# Patient Record
Sex: FEMALE | Race: WHITE | Employment: FULL TIME | URBAN - METROPOLITAN AREA
[De-identification: names, ages, dates, MRNs, and addresses within clinical notes are randomized per-mention and may not be internally consistent; named-entity substitution may affect disease eponyms.]

---

## 2017-02-17 ENCOUNTER — HOSPITAL ENCOUNTER (OUTPATIENT)
Dept: MAMMOGRAPHY | Age: 52
Discharge: HOME | End: 2017-02-17
Attending: OBSTETRICS & GYNECOLOGY | Admitting: OBSTETRICS & GYNECOLOGY

## 2017-02-17 DIAGNOSIS — Z12.31 ENCOUNTER FOR SCREENING MAMMOGRAM FOR MALIGNANT NEOPLASM OF BREAST: ICD-10-CM

## 2018-06-20 ENCOUNTER — HOSPITAL ENCOUNTER (OUTPATIENT)
Dept: MAMMOGRAPHY | Age: 53
Discharge: HOME | End: 2018-06-20
Attending: OBSTETRICS & GYNECOLOGY | Admitting: OBSTETRICS & GYNECOLOGY

## 2018-06-20 DIAGNOSIS — Z12.31 VISIT FOR SCREENING MAMMOGRAM: ICD-10-CM

## 2019-07-15 ENCOUNTER — HOSPITAL ENCOUNTER (OUTPATIENT)
Dept: WOMENS IMAGING | Age: 54
Discharge: HOME | End: 2019-07-15
Payer: MEDICAID

## 2019-07-15 DIAGNOSIS — Z12.39 BREAST CANCER SCREENING: ICD-10-CM

## 2019-07-15 PROCEDURE — 77067 SCR MAMMO BI INCL CAD: CPT

## 2020-09-01 ENCOUNTER — HOSPITAL ENCOUNTER (OUTPATIENT)
Dept: WOMENS IMAGING | Age: 55
Discharge: HOME | End: 2020-09-01
Payer: MEDICAID

## 2020-09-01 PROCEDURE — 77063 BREAST TOMOSYNTHESIS BI: CPT

## 2020-10-22 ENCOUNTER — HOSPITAL ENCOUNTER (OUTPATIENT)
Dept: WOMENS IMAGING | Age: 55
Discharge: HOME | End: 2020-10-22
Payer: MEDICAID

## 2020-10-22 PROCEDURE — 96040 HC GENETIC COUNSELING: CPT

## 2020-10-22 NOTE — PROGRESS NOTES
[facility]  New Visit Clinic Note      Patient Name: Gavin Brooks             YOB: 1965  MRN: [id number]  Date of Visit: 10/22/2020          Gavin Brooks was self-referred for genetic counseling due to her family history of cancer. Ms. Thyra Homans was seen in the [facility] at [facility] on  10/22/2020. Marissa Elizondo, Licensed Genetic Counselor, spent 30 minutes collecting and reviewing personal and family medical information, providing genetic risk assessment, genetic counseling and psychosocial assessment. Clinical History: Gavin Brooks is a 60 y.o. female with no personal history of cancer or tumors. Cancer Surveillance:   Mammogram: annual  Biopsy: yes, in early 29's, benign  Colonoscopy: yes  Polyps: 1 - sessile serrated polyp  Upper endoscopy: yes, no polyps  CESIA/BSO: no    Psychosocial history: no concerns reported    Family History:   · Sister, dx. 62 with uterine cancer  · Mother, dx. 76 with breast cancer and dx. 76 with melanoma  · Maternal grandmother, dx. 66's with breast cancer and colon cancer  · Paternal half-brother, dx. 48 with tonsil cancer  · Father, dx. 61 lung cancer    Ancestry: /  [place] ancestry: no    Cancer Risk Assessment and Genetic Testing: We had a detailed discussion surrounding the concepts of hereditary, familial and sporadic cancer. Regarding the hereditary forms of cancer, autosomal dominant inheritance was reviewed. We briefly discussed potential changes in screening and management guidelines that could occur, based on genetic testing results. After reviewing the medical and family histories and risk assessment, we discussed genetic testing options. Specifically, we talked about currently available single-gene and multi-gene panel testing options, including benefits and limitations. After reviewing the medical and family histories, we discussed that Ms. Mcknight was at low risk for a hereditary predisposition to cancer. Therefore, she would not meet criteria for genetic testing at this time. Testing options and cost were still reviewed, and Ms. Mcknight was given the opportunity to pursue testing. The benefits of first testing an affected individual was further discussed. Specifically, the best person to begin genetic testing in a family is someone who has already had cancer, preferably the relative with the youngest age of diagnosis, as they are the most likely individual to have a mutation. We reviewed that if genetic testing is performed in an individual who has not had cancer and the test is negative, it is difficult to know if the result is negative because the individual did not inherit a family mutation or if the test is negative because the cancer in the family is actually caused by other factors that cannot be ruled out by genetic testing. If it can be confirmed that the individual did not inherit a family mutation, then the individual would be recommended to follow general population cancer screening guidelines. However, if the cancer in the family is caused by other factors, the individual should continue screening based on the family history. Therefore a negative test in an unaffected individual may provide false reassurance. Based on Ms. Mcknight family history, the most informative family member to begin testing would be her sister or mother. If a pathogenic variant is found in her sister or mother, then single-site testing could be offered to other family members. If they would pursue testing and such testing was negative, the chance for a pathogenic variant in the family would likely be significantly reduced and thus additional testing for other family members would not be indicated. We realize that it is not always possible to test other family members, therefore the option of testing Ms. Mcknihgt at today's visit was still offered and recommendations would be made accordingly. Ms. Linda Melo declined genetic testing at this time, based on not meeting criteria for testing. She understands that although genetic testing will not be performed, there may still be an increased risk for cancer in her family. Based on the family history, we reviewed the following screening recommendations:   Close female family members should initiate cancer screening approximately 10 years prior to the earliest diagnosis in the family or by the age of 36, whichever comes first. Women in this family should initiate breast cancer screening at the age of 36.  Screening for women at an increased risk of breast cancer includes annual mammogram as well as possibly annual breast MRI   Semi-annual clinical breast exams and monthly self-breast exams are also recommended   Colonoscopies every 3 to 5 years (or based on colonoscopy findings) beginning 10 years prior to the earliest diagnosis in the family or 36years of age, whichever comes first    We remain available to Ms. Mcknight and her healthcare providers should they have any questions or concerns. We may be reached in the [address] [place] [address] at [phone].     Sincerely,    Josy Montana MS, [facility]  Licensed Genetic Counselor

## 2021-06-02 ENCOUNTER — CLINICAL DOCUMENTATION (OUTPATIENT)
Dept: OTHER | Age: 56
End: 2021-06-02

## 2021-10-27 ENCOUNTER — HOSPITAL ENCOUNTER (OUTPATIENT)
Dept: WOMENS IMAGING | Age: 56
Discharge: HOME | End: 2021-10-27
Payer: MEDICAID

## 2021-10-27 DIAGNOSIS — Z12.31 BREAST CANCER SCREENING BY MAMMOGRAM: ICD-10-CM

## 2021-10-27 PROCEDURE — 77067 SCR MAMMO BI INCL CAD: CPT

## 2022-12-12 ENCOUNTER — HOSPITAL ENCOUNTER (OUTPATIENT)
Dept: WOMENS IMAGING | Age: 57
Discharge: HOME | End: 2022-12-12
Payer: MEDICAID

## 2022-12-12 VITALS — BODY MASS INDEX: 25.77 KG/M2 | HEIGHT: 69 IN | WEIGHT: 174 LBS

## 2022-12-12 DIAGNOSIS — Z12.31 BREAST CANCER SCREENING BY MAMMOGRAM: ICD-10-CM

## 2022-12-12 PROCEDURE — 77063 BREAST TOMOSYNTHESIS BI: CPT

## 2024-01-30 ENCOUNTER — HOSPITAL ENCOUNTER (OUTPATIENT)
Dept: WOMENS IMAGING | Age: 59
Discharge: HOME | End: 2024-01-30
Payer: MEDICAID

## 2024-01-30 DIAGNOSIS — R92.8 ABNORMAL MAMMOGRAM: ICD-10-CM

## 2024-01-30 PROCEDURE — G0279 TOMOSYNTHESIS, MAMMO: HCPCS

## 2024-04-23 ENCOUNTER — OFFICE VISIT (OUTPATIENT)
Dept: SURGERY | Age: 59
End: 2024-04-23
Payer: MEDICAID

## 2024-04-23 VITALS
WEIGHT: 178.2 LBS | HEART RATE: 67 BPM | RESPIRATION RATE: 18 BRPM | BODY MASS INDEX: 26.39 KG/M2 | HEIGHT: 69 IN | OXYGEN SATURATION: 98 %

## 2024-04-23 DIAGNOSIS — Z12.39 BREAST CANCER SCREENING, HIGH RISK PATIENT: ICD-10-CM

## 2024-04-23 DIAGNOSIS — Z91.89 AT HIGH RISK FOR BREAST CANCER: Primary | ICD-10-CM

## 2024-04-23 DIAGNOSIS — Z80.3 FAMILY HISTORY OF BREAST CANCER: ICD-10-CM

## 2024-04-23 DIAGNOSIS — Z12.31 ENCOUNTER FOR SCREENING MAMMOGRAM FOR BREAST CANCER: ICD-10-CM

## 2024-04-23 DIAGNOSIS — Z12.39 ENCOUNTER FOR SCREENING BREAST EXAMINATION: ICD-10-CM

## 2024-04-23 PROCEDURE — 1036F TOBACCO NON-USER: CPT | Performed by: NURSE PRACTITIONER

## 2024-04-23 PROCEDURE — G8427 DOCREV CUR MEDS BY ELIG CLIN: HCPCS | Performed by: NURSE PRACTITIONER

## 2024-04-23 PROCEDURE — 3017F COLORECTAL CA SCREEN DOC REV: CPT | Performed by: NURSE PRACTITIONER

## 2024-04-23 PROCEDURE — G8419 CALC BMI OUT NRM PARAM NOF/U: HCPCS | Performed by: NURSE PRACTITIONER

## 2024-04-23 PROCEDURE — 99204 OFFICE O/P NEW MOD 45 MIN: CPT | Performed by: NURSE PRACTITIONER

## 2024-04-23 RX ORDER — METHOCARBAMOL 500 MG/1
500 TABLET, FILM COATED ORAL EVERY 8 HOURS PRN
COMMUNITY

## 2024-04-23 RX ORDER — FLUTICASONE PROPIONATE 50 MCG
50 SPRAY, SUSPENSION (ML) NASAL DAILY
COMMUNITY
Start: 2022-03-25

## 2024-04-23 RX ORDER — LORATADINE 10 MG/1
10 TABLET ORAL DAILY
COMMUNITY

## 2024-04-23 NOTE — PATIENT INSTRUCTIONS
Dermatology: Angelina Vilchis NP   [place] [facility]  [address]  [place] [address]  Phone: [phone]  Fax: [phone]    [place] [address]   [address]  [place] [address]  Phone: [phone]  Fax: [phone]

## 2024-04-23 NOTE — PROGRESS NOTES
4/23/2024), Disp: 120 tablet, Rfl: 0    meloxicam (MOBIC) 15 MG tablet, Take 1 tablet by mouth daily. (Patient not taking: Reported on 4/23/2024), Disp: 30 tablet, Rfl: 3    celecoxib (CELEBREX) 200 MG capsule, Take 1 capsule by mouth daily for 30 doses., Disp: 30 capsule, Rfl: 0    estradiol (ESTRACE) 0.5 MG tablet, Take 0.5 mg by mouth daily. (Patient not taking: Reported on 4/23/2024), Disp: , Rfl:       Medications: documentation has been reviewed in the electronic medical record and patient office intake form.    REVIEW OF SYSTEMS:  Constitutional: Negative for fever  HENT: Negative for sore throat  Eyes: Negative for redness   Respiratory: Negative for dyspnea, cough  Cardiovascular: Negative for chest pain  Gastrointestinal: Negative for vomiting, diarrhea   Genitourinary: Negative for hematuria   Musculoskeletal: Negative for arthralgias   Skin: Negative for rash  Neurological: Negative for syncope  Hematological: Negative for adenopathy  Psychiatric/Behavorial: Negative for anxiety    PHYSICAL EXAM:  Pulse 67   Resp 18   Ht 1.753 m (5' 9\")   Wt 80.8 kg (178 lb 3.2 oz)   LMP 11/12/2010   SpO2 98%   BMI 26.32 kg/m²   Constitutional: She appearswell-nourished. No apparent distress.  Breast: The patient was examined in the upright and supine position.  She has a \"D\"cup breast. Breasts are symmetrically ptotic.        Right: No new masses or changes in breast contour.  No skin changes of the breast or nipple areolar complex.  No nipple inversion or discharge. No erythema, thickening (peau d'orange), or dimpling.        Left: No new masses or changes in breast contour.  No skin changes of the breast or nipple areolar complex.  No nipple inversion or discharge. No erythema, thickening (peau d'orange), or dimpling.   There is no axillary lymphadenopathy palpated bilaterally.   Neck: Neck supple. No tracheal deviation present. No obvious mass.  Pulmonary: No accessory muscle use.  Respirations non-labored and

## 2024-07-02 ENCOUNTER — HOSPITAL ENCOUNTER (OUTPATIENT)
Dept: MRI IMAGING | Age: 59
Discharge: HOME | End: 2024-07-02
Payer: MEDICAID

## 2024-07-02 DIAGNOSIS — Z12.39 BREAST CANCER SCREENING, HIGH RISK PATIENT: ICD-10-CM

## 2024-07-02 DIAGNOSIS — Z12.31 ENCOUNTER FOR SCREENING MAMMOGRAM FOR BREAST CANCER: ICD-10-CM

## 2024-07-02 DIAGNOSIS — Z80.3 FAMILY HISTORY OF BREAST CANCER: ICD-10-CM

## 2024-07-02 DIAGNOSIS — Z12.39 ENCOUNTER FOR SCREENING BREAST EXAMINATION: ICD-10-CM

## 2024-07-02 DIAGNOSIS — Z91.89 AT HIGH RISK FOR BREAST CANCER: ICD-10-CM

## 2024-07-02 LAB
CREAT SERPL-MCNC: 0.6 MG/DL (ref 0.6–1.2)
GFR SERPLBLD CREATININE-BSD FMLA CKD-EPI: >90 ML/MIN/{1.73_M2}

## 2024-07-02 PROCEDURE — 36415 COLL VENOUS BLD VENIPUNCTURE: CPT

## 2024-07-02 PROCEDURE — 6360000004 HC RX CONTRAST MEDICATION: Performed by: NURSE PRACTITIONER

## 2024-07-02 PROCEDURE — A9577 INJ MULTIHANCE: HCPCS | Performed by: NURSE PRACTITIONER

## 2024-07-02 PROCEDURE — 82565 ASSAY OF CREATININE: CPT

## 2024-07-02 PROCEDURE — 2580000003 HC RX 258: Performed by: NURSE PRACTITIONER

## 2024-07-02 PROCEDURE — C8908 MRI W/O FOL W/CONT, BREAST,: HCPCS

## 2024-07-02 RX ORDER — SODIUM CHLORIDE 0.9 % (FLUSH) 0.9 %
5-40 SYRINGE (ML) INJECTION 2 TIMES DAILY
Status: DISCONTINUED | OUTPATIENT
Start: 2024-07-02 | End: 2024-07-03 | Stop reason: HOSPADM

## 2024-07-02 RX ADMIN — GADOBENATE DIMEGLUMINE 16 ML: 529 INJECTION, SOLUTION INTRAVENOUS at 10:58

## 2024-07-02 RX ADMIN — Medication 25 ML: at 10:59

## 2024-07-03 ENCOUNTER — TELEPHONE (OUTPATIENT)
Dept: WOMENS IMAGING | Age: 59
End: 2024-07-03

## 2024-07-03 ENCOUNTER — PRE-PROCEDURE TELEPHONE (OUTPATIENT)
Dept: WOMENS IMAGING | Age: 59
End: 2024-07-03

## 2024-07-03 DIAGNOSIS — R92.8 ABNORMAL FINDING ON BREAST IMAGING: Primary | ICD-10-CM

## 2024-07-24 ENCOUNTER — HOSPITAL ENCOUNTER (OUTPATIENT)
Dept: ULTRASOUND IMAGING | Age: 59
Discharge: HOME | End: 2024-07-24
Payer: MEDICAID

## 2024-07-24 ENCOUNTER — HOSPITAL ENCOUNTER (OUTPATIENT)
Dept: WOMENS IMAGING | Age: 59
Discharge: HOME | End: 2024-07-24
Payer: MEDICAID

## 2024-07-24 VITALS — WEIGHT: 171 LBS | BODY MASS INDEX: 25.33 KG/M2 | HEIGHT: 69 IN

## 2024-07-24 DIAGNOSIS — R92.8 ABNORMAL FINDING ON BREAST IMAGING: ICD-10-CM

## 2024-07-24 PROCEDURE — 77065 DX MAMMO INCL CAD UNI: CPT

## 2024-07-24 PROCEDURE — 2500000003 HC RX 250 WO HCPCS: Performed by: NURSE PRACTITIONER

## 2024-07-24 PROCEDURE — 76642 ULTRASOUND BREAST LIMITED: CPT

## 2024-07-24 PROCEDURE — 88305 TISSUE EXAM BY PATHOLOGIST: CPT

## 2024-07-24 PROCEDURE — 19083 BX BREAST 1ST LESION US IMAG: CPT

## 2024-07-24 PROCEDURE — 88342 IMHCHEM/IMCYTCHM 1ST ANTB: CPT

## 2024-07-24 PROCEDURE — G0279 TOMOSYNTHESIS, MAMMO: HCPCS

## 2024-07-24 RX ORDER — LIDOCAINE HYDROCHLORIDE AND EPINEPHRINE 10; 10 MG/ML; UG/ML
20 INJECTION, SOLUTION INFILTRATION; PERINEURAL ONCE
Status: COMPLETED | OUTPATIENT
Start: 2024-07-24 | End: 2024-07-24

## 2024-07-24 RX ORDER — LIDOCAINE HYDROCHLORIDE 10 MG/ML
5 INJECTION, SOLUTION EPIDURAL; INFILTRATION; INTRACAUDAL; PERINEURAL ONCE
Status: COMPLETED | OUTPATIENT
Start: 2024-07-24 | End: 2024-07-24

## 2024-07-24 RX ADMIN — LIDOCAINE HYDROCHLORIDE ANHYDROUS 5 ML: 10 INJECTION, SOLUTION INFILTRATION at 15:02

## 2024-07-24 RX ADMIN — LIDOCAINE HYDROCHLORIDE,EPINEPHRINE BITARTRATE 10 ML: 10; .01 INJECTION, SOLUTION INFILTRATION; PERINEURAL at 15:02

## 2024-07-26 ENCOUNTER — TELEPHONE (OUTPATIENT)
Dept: WOMENS IMAGING | Age: 59
End: 2024-07-26

## 2024-07-26 NOTE — TELEPHONE ENCOUNTER
Nurse Navigator reviewed results of breast biopsy which showed benign breast tissue with sclerosing adenosis and fibroadenomatoid   Change on the pathology report.  Negative for atypia or malignancy. Once addendum is complete, NN will reach out to patient and advise recommendations.

## 2024-07-31 DIAGNOSIS — R92.8 ABNORMAL FINDING ON BREAST IMAGING: Primary | ICD-10-CM

## 2024-12-23 ENCOUNTER — HOSPITAL ENCOUNTER (OUTPATIENT)
Dept: WOMENS IMAGING | Age: 59
Discharge: HOME | End: 2024-12-23
Payer: MEDICAID

## 2024-12-23 ENCOUNTER — HOSPITAL ENCOUNTER (OUTPATIENT)
Dept: MRI IMAGING | Age: 59
Discharge: HOME | End: 2024-12-23
Payer: MEDICAID

## 2024-12-23 VITALS — WEIGHT: 173 LBS | HEIGHT: 69 IN | BODY MASS INDEX: 25.62 KG/M2

## 2024-12-23 DIAGNOSIS — Z12.39 ENCOUNTER FOR SCREENING BREAST EXAMINATION: ICD-10-CM

## 2024-12-23 DIAGNOSIS — R92.8 ABNORMAL FINDING ON BREAST IMAGING: ICD-10-CM

## 2024-12-23 DIAGNOSIS — Z12.31 ENCOUNTER FOR SCREENING MAMMOGRAM FOR BREAST CANCER: ICD-10-CM

## 2024-12-23 DIAGNOSIS — Z91.89 AT HIGH RISK FOR BREAST CANCER: ICD-10-CM

## 2024-12-23 DIAGNOSIS — Z80.3 FAMILY HISTORY OF BREAST CANCER: ICD-10-CM

## 2024-12-23 DIAGNOSIS — Z12.39 BREAST CANCER SCREENING, HIGH RISK PATIENT: ICD-10-CM

## 2024-12-23 LAB
CREAT SERPL-MCNC: 0.8 MG/DL (ref 0.6–1.2)
GFR SERPLBLD CREATININE-BSD FMLA CKD-EPI: 84 ML/MIN/{1.73_M2}

## 2024-12-23 PROCEDURE — 36415 COLL VENOUS BLD VENIPUNCTURE: CPT

## 2024-12-23 PROCEDURE — 77063 BREAST TOMOSYNTHESIS BI: CPT

## 2024-12-23 PROCEDURE — A9577 INJ MULTIHANCE: HCPCS | Performed by: NURSE PRACTITIONER

## 2024-12-23 PROCEDURE — 6360000004 HC RX CONTRAST MEDICATION: Performed by: NURSE PRACTITIONER

## 2024-12-23 PROCEDURE — C8908 MRI W/O FOL W/CONT, BREAST,: HCPCS

## 2024-12-23 PROCEDURE — 82565 ASSAY OF CREATININE: CPT

## 2024-12-23 PROCEDURE — 77067 SCR MAMMO BI INCL CAD: CPT

## 2024-12-23 RX ADMIN — GADOBENATE DIMEGLUMINE 15 ML: 529 INJECTION, SOLUTION INTRAVENOUS at 08:39

## 2025-01-07 ENCOUNTER — TELEPHONE (OUTPATIENT)
Dept: SURGERY | Age: 60
End: 2025-01-07

## 2025-01-07 NOTE — TELEPHONE ENCOUNTER
Left VM for patient to return call for rescheduling of cancelled appointment today. Please transfer to me when she calls back.